# Patient Record
Sex: MALE | Race: WHITE | NOT HISPANIC OR LATINO | Employment: UNEMPLOYED | URBAN - METROPOLITAN AREA
[De-identification: names, ages, dates, MRNs, and addresses within clinical notes are randomized per-mention and may not be internally consistent; named-entity substitution may affect disease eponyms.]

---

## 2021-08-19 ENCOUNTER — TELEPHONE (OUTPATIENT)
Dept: UROLOGY | Facility: AMBULATORY SURGERY CENTER | Age: 26
End: 2021-08-19

## 2021-08-19 NOTE — TELEPHONE ENCOUNTER
Please Triage - [facility]  New Patient- pt stated he does not have a pcp    What is the reason for the patients appointment? Urgent care in [place] referred patient for possible prostatitis  Patient stated they did treat him for a UTI     Imaging/Lab Results:      Do we accept the patient's insurance or is the patient Self-Pay? Provider & Plan: [facility]  Member ID#: pt will call back with number     Has the patient had any previous urologist(s)? no       Have patient records been requested? Has the patient had any outside testing done?       Does the patient have a personal history of cancer? no      Patient can be reached at :[phone]

## 2021-08-19 NOTE — TELEPHONE ENCOUNTER
Spoke to patient and NP appointment has been scheduled for 9/8 to be evaluated  Advised the patient to call the office in the meantime with any concerns

## 2024-09-23 ENCOUNTER — OFFICE VISIT (OUTPATIENT)
Dept: URGENT CARE | Age: 29
End: 2024-09-23
Payer: COMMERCIAL

## 2024-09-23 VITALS
OXYGEN SATURATION: 98 % | RESPIRATION RATE: 20 BRPM | DIASTOLIC BLOOD PRESSURE: 69 MMHG | SYSTOLIC BLOOD PRESSURE: 103 MMHG | TEMPERATURE: 97 F | HEART RATE: 65 BPM

## 2024-09-23 DIAGNOSIS — J06.9 UPPER RESPIRATORY TRACT INFECTION, UNSPECIFIED TYPE: ICD-10-CM

## 2024-09-23 DIAGNOSIS — J02.9 SORE THROAT: ICD-10-CM

## 2024-09-23 DIAGNOSIS — J02.0 STREP PHARYNGITIS: Primary | ICD-10-CM

## 2024-09-23 LAB — S PYO AG THROAT QL: POSITIVE

## 2024-09-23 PROCEDURE — 87880 STREP A ASSAY W/OPTIC: CPT

## 2024-09-23 PROCEDURE — 99213 OFFICE O/P EST LOW 20 MIN: CPT

## 2024-09-23 PROCEDURE — 93005 ELECTROCARDIOGRAM TRACING: CPT

## 2024-09-23 RX ORDER — ALBUTEROL SULFATE 90 UG/1
2 INHALANT RESPIRATORY (INHALATION) EVERY 6 HOURS PRN
Qty: 8.5 G | Refills: 0 | Status: SHIPPED | OUTPATIENT
Start: 2024-09-23

## 2024-09-23 RX ORDER — GUAIFENESIN 600 MG/1
1200 TABLET, EXTENDED RELEASE ORAL EVERY 12 HOURS SCHEDULED
Qty: 20 TABLET | Refills: 0 | Status: SHIPPED | OUTPATIENT
Start: 2024-09-23 | End: 2024-09-28

## 2024-09-23 RX ORDER — AMOXICILLIN 500 MG/1
500 CAPSULE ORAL EVERY 12 HOURS SCHEDULED
Qty: 20 CAPSULE | Refills: 0 | Status: SHIPPED | OUTPATIENT
Start: 2024-09-23 | End: 2024-10-03

## 2024-09-24 LAB
ATRIAL RATE: 68 BPM
P AXIS: 70 DEGREES
PR INTERVAL: 168 MS
QRS AXIS: 70 DEGREES
QRSD INTERVAL: 94 MS
QT INTERVAL: 398 MS
QTC INTERVAL: 423 MS
T WAVE AXIS: 54 DEGREES
VENTRICULAR RATE: 68 BPM

## 2024-09-24 PROCEDURE — 93010 ELECTROCARDIOGRAM REPORT: CPT | Performed by: INTERNAL MEDICINE

## 2024-09-24 NOTE — PROGRESS NOTES
[facility]        NAME: Edumnd Stephen is a 29 y.o. male  : 1995    MRN: [id number]  DATE: 2024  TIME: 8:19 PM    Assessment and Plan   Strep pharyngitis [J02.0]  1. Strep pharyngitis  POCT rapid ANTIGEN strepA    albuterol (ProAir HFA) 90 mcg/act inhaler    guaiFENesin (MUCINEX) 600 mg 12 hr tablet    amoxicillin (AMOXIL) 500 mg capsule      2. Sore throat        3. Upper respiratory tract infection, unspecified type  ECG 12 lead        In office strep test is positive today.    Take antibiotic-Amoxicillin as directed.  Recommend daily probiotic while on antibiotic or eat yogurt with live cultures daily while on antibiotic.       After 3 days on the antibiotics, discard your toothbrush and get a new one so you do not reinfect yourself.    You may take Tylenol or Motrin for pain and fever.    Albuterol inhaler for wheezing and shortness of breath.    Mucinex for cough    Flonase- one spray each nostril daily for nasal congestions.    Rest, increase fluids, good hand washing.  Please follow up with your family doctor if no improvement in 7-10 days.     Patient Instructions       Follow up with PCP in 3-5 days.  Proceed to  ER if symptoms worsen.    If tests have been performed at [facility], our office will contact you with results if changes need to be made to the care plan discussed with you at the visit.  You can review your full results on [facility].    Chief Complaint     Chief Complaint   Patient presents with    Cough    Sore Throat     Sore throat, cough, head congestion, mucus color yellow pus behind throat chest pain  it's been 6 days.         History of Present Illness       Pt is a 29 year old male with sore throat, congestion, headaches, and coughing up phlegm for 10 days.  Reports feeling better around day 5, then started with symptoms again with intermittent chest pain, no provoking or alleviating symptoms.  He denies n/v/d, SOB, or fever, chills. He is eating and  drinking well.  He is taking motrin and theraflu with moderate relief.     Cough  Associated symptoms include a sore throat. Pertinent negatives include no chills, ear pain, fever, postnasal drip or rhinorrhea.   Sore Throat   Associated symptoms include congestion and coughing. Pertinent negatives include no ear pain.       Review of Systems   Review of Systems   Constitutional:  Negative for chills and fever.   HENT:  Positive for congestion, sinus pain and sore throat. Negative for ear pain, postnasal drip and rhinorrhea.    Respiratory:  Positive for cough.          Current Medications       Current Outpatient Medications:     albuterol (ProAir HFA) 90 mcg/act inhaler, Inhale 2 puffs every 6 (six) hours as needed for shortness of breath, Disp: 8.5 g, Rfl: 0    amoxicillin (AMOXIL) 500 mg capsule, Take 1 capsule (500 mg total) by mouth every 12 (twelve) hours for 10 days, Disp: 20 capsule, Rfl: 0    guaiFENesin (MUCINEX) 600 mg 12 hr tablet, Take 2 tablets (1,200 mg total) by mouth every 12 (twelve) hours for 5 days, Disp: 20 tablet, Rfl: 0    fluticasone (FLONASE) 50 mcg/act nasal spray, 1 spray into each nostril daily, Disp: 16 g, Rfl: 3    Current Allergies     Allergies as of 09/23/2024    (No Known Allergies)            The following portions of the patient's history were reviewed and updated as appropriate: allergies, current medications, past family history, past medical history, past social history, past surgical history and problem list.     Past Medical History:   Diagnosis Date    Asthma     GERD (gastroesophageal reflux disease)        History reviewed. No pertinent surgical history.    History reviewed. No pertinent family history.      Medications have been verified.        Objective   /69   Pulse 65   Temp (!) 97 °F (36.1 °C) (Temporal)   Resp 20   SpO2 98%   No LMP for male patient.       Physical Exam     Physical Exam  Vitals and nursing note reviewed.   Constitutional:       General:  He is not in acute distress.     Appearance: He is well-developed and normal weight. He is ill-appearing.   HENT:      Head: Normocephalic.      Right Ear: Tympanic membrane normal.      Left Ear: Tympanic membrane normal.      Nose: No congestion or rhinorrhea.      Mouth/Throat:      Pharynx: Posterior oropharyngeal erythema present.      Tonsils: No tonsillar exudate. 2+ on the right. 2+ on the left.   Cardiovascular:      Rate and Rhythm: Normal rate.   Pulmonary:      Effort: Pulmonary effort is normal. No respiratory distress.      Breath sounds: Normal breath sounds. No stridor. No wheezing, rhonchi or rales.   Chest:      Chest wall: No tenderness.   Abdominal:      Palpations: Abdomen is soft.   Lymphadenopathy:      Cervical: Cervical adenopathy present.   Neurological:      Mental Status: He is alert.

## 2024-09-24 NOTE — PATIENT INSTRUCTIONS
In office strep test is positive today.    Take antibiotic-Amoxicillin as directed.  Recommend daily probiotic while on antibiotic or eat yogurt with live cultures daily while on antibiotic.       After 3 days on the antibiotics, discard your toothbrush and get a new one so you do not reinfect yourself.    You may take Tylenol or Motrin for pain and fever.    Albuterol inhaler for wheezing and shortness of breath.    Mucinex for cough    Flonase- one spray each nostril daily for nasal congestions.    Rest, increase fluids, good hand washing.  Please follow up with your family doctor if no improvement in 7-10 days.